# Patient Record
Sex: FEMALE | Race: ASIAN | NOT HISPANIC OR LATINO | URBAN - METROPOLITAN AREA
[De-identification: names, ages, dates, MRNs, and addresses within clinical notes are randomized per-mention and may not be internally consistent; named-entity substitution may affect disease eponyms.]

---

## 2017-09-11 ENCOUNTER — EMERGENCY (EMERGENCY)
Facility: HOSPITAL | Age: 52
LOS: 1 days | Discharge: HOME | End: 2017-09-11
Attending: EMERGENCY MEDICINE | Admitting: EMERGENCY MEDICINE
Payer: MEDICAID

## 2017-09-11 VITALS
HEART RATE: 91 BPM | SYSTOLIC BLOOD PRESSURE: 152 MMHG | OXYGEN SATURATION: 91 % | DIASTOLIC BLOOD PRESSURE: 96 MMHG | TEMPERATURE: 99 F | RESPIRATION RATE: 18 BRPM

## 2017-09-11 DIAGNOSIS — Z98.891 HISTORY OF UTERINE SCAR FROM PREVIOUS SURGERY: Chronic | ICD-10-CM

## 2017-09-11 DIAGNOSIS — G45.4 TRANSIENT GLOBAL AMNESIA: ICD-10-CM

## 2017-09-11 LAB
ALBUMIN SERPL ELPH-MCNC: 4.4 G/DL — SIGNIFICANT CHANGE UP (ref 3.3–5)
ALP SERPL-CCNC: 64 U/L — SIGNIFICANT CHANGE UP (ref 40–120)
ALT FLD-CCNC: 18 U/L — SIGNIFICANT CHANGE UP (ref 4–33)
APPEARANCE UR: CLEAR — SIGNIFICANT CHANGE UP
APTT BLD: 31.8 SEC — SIGNIFICANT CHANGE UP (ref 27.5–37.4)
AST SERPL-CCNC: 21 U/L — SIGNIFICANT CHANGE UP (ref 4–32)
BASE EXCESS BLDV CALC-SCNC: 2.2 MMOL/L — SIGNIFICANT CHANGE UP
BASOPHILS # BLD AUTO: 0.02 K/UL — SIGNIFICANT CHANGE UP (ref 0–0.2)
BASOPHILS NFR BLD AUTO: 0.4 % — SIGNIFICANT CHANGE UP (ref 0–2)
BILIRUB SERPL-MCNC: 0.3 MG/DL — SIGNIFICANT CHANGE UP (ref 0.2–1.2)
BILIRUB UR-MCNC: NEGATIVE — SIGNIFICANT CHANGE UP
BLOOD GAS VENOUS - CREATININE: 0.48 MG/DL — LOW (ref 0.5–1.3)
BLOOD UR QL VISUAL: NEGATIVE — SIGNIFICANT CHANGE UP
BUN SERPL-MCNC: 18 MG/DL — SIGNIFICANT CHANGE UP (ref 7–23)
CALCIUM SERPL-MCNC: 9.3 MG/DL — SIGNIFICANT CHANGE UP (ref 8.4–10.5)
CHLORIDE BLDV-SCNC: 106 MMOL/L — SIGNIFICANT CHANGE UP (ref 96–108)
CHLORIDE SERPL-SCNC: 106 MMOL/L — SIGNIFICANT CHANGE UP (ref 98–107)
CHOLEST SERPL-MCNC: 174 MG/DL — SIGNIFICANT CHANGE UP (ref 120–199)
CO2 SERPL-SCNC: 26 MMOL/L — SIGNIFICANT CHANGE UP (ref 22–31)
COLOR SPEC: SIGNIFICANT CHANGE UP
CREAT SERPL-MCNC: 0.61 MG/DL — SIGNIFICANT CHANGE UP (ref 0.5–1.3)
EOSINOPHIL # BLD AUTO: 0.09 K/UL — SIGNIFICANT CHANGE UP (ref 0–0.5)
EOSINOPHIL NFR BLD AUTO: 1.6 % — SIGNIFICANT CHANGE UP (ref 0–6)
GAS PNL BLDV: 139 MMOL/L — SIGNIFICANT CHANGE UP (ref 136–146)
GLUCOSE BLDV-MCNC: 99 — SIGNIFICANT CHANGE UP (ref 70–99)
GLUCOSE SERPL-MCNC: 102 MG/DL — HIGH (ref 70–99)
GLUCOSE UR-MCNC: NEGATIVE — SIGNIFICANT CHANGE UP
HBA1C BLD-MCNC: 5.9 % — HIGH (ref 4–5.6)
HCO3 BLDV-SCNC: 25 MMOL/L — SIGNIFICANT CHANGE UP (ref 20–27)
HCT VFR BLD CALC: 39.7 % — SIGNIFICANT CHANGE UP (ref 34.5–45)
HCT VFR BLDV CALC: 41.6 % — SIGNIFICANT CHANGE UP (ref 34.5–45)
HDLC SERPL-MCNC: 68 MG/DL — HIGH (ref 45–65)
HGB BLD-MCNC: 13.3 G/DL — SIGNIFICANT CHANGE UP (ref 11.5–15.5)
HGB BLDV-MCNC: 13.5 G/DL — SIGNIFICANT CHANGE UP (ref 11.5–15.5)
IMM GRANULOCYTES # BLD AUTO: 0.01 # — SIGNIFICANT CHANGE UP
IMM GRANULOCYTES NFR BLD AUTO: 0.2 % — SIGNIFICANT CHANGE UP (ref 0–1.5)
INR BLD: 0.92 — SIGNIFICANT CHANGE UP (ref 0.88–1.17)
KETONES UR-MCNC: NEGATIVE — SIGNIFICANT CHANGE UP
LACTATE BLDV-MCNC: 0.8 MMOL/L — SIGNIFICANT CHANGE UP (ref 0.5–2)
LEUKOCYTE ESTERASE UR-ACNC: NEGATIVE — SIGNIFICANT CHANGE UP
LIPID PNL WITH DIRECT LDL SERPL: 100 MG/DL — SIGNIFICANT CHANGE UP
LYMPHOCYTES # BLD AUTO: 1.66 K/UL — SIGNIFICANT CHANGE UP (ref 1–3.3)
LYMPHOCYTES # BLD AUTO: 30.1 % — SIGNIFICANT CHANGE UP (ref 13–44)
MCHC RBC-ENTMCNC: 31.1 PG — SIGNIFICANT CHANGE UP (ref 27–34)
MCHC RBC-ENTMCNC: 33.5 % — SIGNIFICANT CHANGE UP (ref 32–36)
MCV RBC AUTO: 93 FL — SIGNIFICANT CHANGE UP (ref 80–100)
MONOCYTES # BLD AUTO: 0.38 K/UL — SIGNIFICANT CHANGE UP (ref 0–0.9)
MONOCYTES NFR BLD AUTO: 6.9 % — SIGNIFICANT CHANGE UP (ref 2–14)
NEUTROPHILS # BLD AUTO: 3.35 K/UL — SIGNIFICANT CHANGE UP (ref 1.8–7.4)
NEUTROPHILS NFR BLD AUTO: 60.8 % — SIGNIFICANT CHANGE UP (ref 43–77)
NITRITE UR-MCNC: NEGATIVE — SIGNIFICANT CHANGE UP
NRBC # FLD: 0 — SIGNIFICANT CHANGE UP
PCO2 BLDV: 48 MMHG — SIGNIFICANT CHANGE UP (ref 41–51)
PH BLDV: 7.37 PH — SIGNIFICANT CHANGE UP (ref 7.32–7.43)
PH UR: 6.5 — SIGNIFICANT CHANGE UP (ref 4.6–8)
PLATELET # BLD AUTO: 215 K/UL — SIGNIFICANT CHANGE UP (ref 150–400)
PMV BLD: 10.4 FL — SIGNIFICANT CHANGE UP (ref 7–13)
PO2 BLDV: 30 MMHG — LOW (ref 35–40)
POTASSIUM BLDV-SCNC: 3.6 MMOL/L — SIGNIFICANT CHANGE UP (ref 3.4–4.5)
POTASSIUM SERPL-MCNC: 3.9 MMOL/L — SIGNIFICANT CHANGE UP (ref 3.5–5.3)
POTASSIUM SERPL-SCNC: 3.9 MMOL/L — SIGNIFICANT CHANGE UP (ref 3.5–5.3)
PROT SERPL-MCNC: 7.9 G/DL — SIGNIFICANT CHANGE UP (ref 6–8.3)
PROT UR-MCNC: NEGATIVE — SIGNIFICANT CHANGE UP
PROTHROM AB SERPL-ACNC: 10.3 SEC — SIGNIFICANT CHANGE UP (ref 9.8–13.1)
RBC # BLD: 4.27 M/UL — SIGNIFICANT CHANGE UP (ref 3.8–5.2)
RBC # FLD: 11.9 % — SIGNIFICANT CHANGE UP (ref 10.3–14.5)
SAO2 % BLDV: 50.4 % — LOW (ref 60–85)
SODIUM SERPL-SCNC: 147 MMOL/L — HIGH (ref 135–145)
SP GR SPEC: 1 — LOW (ref 1–1.03)
TRIGL SERPL-MCNC: 99 MG/DL — SIGNIFICANT CHANGE UP (ref 10–149)
UROBILINOGEN FLD QL: NORMAL E.U. — SIGNIFICANT CHANGE UP (ref 0.1–0.2)
WBC # BLD: 5.51 K/UL — SIGNIFICANT CHANGE UP (ref 3.8–10.5)
WBC # FLD AUTO: 5.51 K/UL — SIGNIFICANT CHANGE UP (ref 3.8–10.5)
WBC UR QL: SIGNIFICANT CHANGE UP (ref 0–?)

## 2017-09-11 PROCEDURE — 70450 CT HEAD/BRAIN W/O DYE: CPT | Mod: 26

## 2017-09-11 PROCEDURE — 99218: CPT

## 2017-09-11 RX ORDER — ASPIRIN/CALCIUM CARB/MAGNESIUM 324 MG
81 TABLET ORAL DAILY
Qty: 0 | Refills: 0 | Status: DISCONTINUED | OUTPATIENT
Start: 2017-09-11 | End: 2017-09-15

## 2017-09-11 RX ADMIN — Medication 81 MILLIGRAM(S): at 18:24

## 2017-09-11 NOTE — ED PROVIDER NOTE - PROGRESS NOTE DETAILS
Manuel:  assessed patient in triage.  52F unclear medical history, brought in by son for acutely altered mental status.  Pt last seen normal around 3:30pm today by son when he arrived home from school.  Son then took a nap, woken up around 4:30pm by patient, who was then acutely confused.  Did not remember how old son was nor whether he was in school, did not remember where her daughter was, and thought it was year 2011.  Exam:  fingerstick 87, vital signs within normal limits; patient well appearing, rrr, ctab, abd soft ntnd, motor and sensation to light touch grossly equal bilateral face and extremities, speech clear, a&o x 2.  Possibly transient global amnesia, r/o stroke.  Stroke code called, spoke with neurology resident on the phone, endorsed to Dr. Foster and Hemanth on green side.

## 2017-09-11 NOTE — ED CDU PROVIDER NOTE - PLAN OF CARE
Follow up with your doctor for a post hospital visit within 48 hours, taking all results from the ER to be reviewed.  Continue all medications you were on prior to this visit. Follow up with your doctor for a post hospital visit within 48 hours, taking all results from the ER to be reviewed.  Continue all medications you were on prior to this visit. In addition complete the Augmentin 875mg 1 tab twice a day with food we sent to your pharmacy for your sinusitis.  Set up a follow up to be seen by general neurology in 1-2 weeks. Call to make the appointment [phone].  If any recurrent symptoms, worsening, concerning or new signs or symptoms return to the ER

## 2017-09-11 NOTE — CONSULT NOTE ADULT - ASSESSMENT
52 year old right handed Chinese female with no PMH presents with AMS. As per patient's son at bedside, 3pm was the last known normal. Around 430pm the son noticed that patient kept asking the same questions over and over again. Patient states she does not remember anything that happened today or last night. CT head negative. Nonfocal neuro exam however patient keeps repeating the same questions. Memory loss from today.  NIHSS 1. PreMRS 0     Acute memory loss likely secondary to transient global amnesia however PCA infarct should be ruled out. 52 year old right handed Chinese female with no PMH presents with AMS. As per patient's son at bedside, 3pm was the last known normal. Around 430pm the son noticed that patient kept asking the same questions over and over again. Patient states she does not remember anything that happened today or last night. CT head negative. Nonfocal neuro exam however patient keeps repeating the same questions. Memory loss from today. Not a tPA candidate as patient only has memory loss with no other neurological deficits.  NIHSS 1. PreMRS 0     Acute memory loss likely secondary to transient global amnesia however PCA infarct should be ruled out.

## 2017-09-11 NOTE — ED CDU PROVIDER NOTE - PROGRESS NOTE DETAILS
FEDERICO Perkins: Pt resting comfortably with no complaints. Pt awaiting MRI/ MRA. Will continue to monitor. Hugo LÓPEZ- CDU PROGRESS NOTE: No events overnight, ot feels better now, states that her left eye was pink with eye pain behind her left eye few days ago when she had flu like symptoms and now she is not confused, no weakness, numbness    pt is alert, well appearing female, s1s2 normal reg, b/l breath sounds, peerl eomi, abd soft, nt, nd, neuro aox3, cn 2-12 intact, skin warm, dry, good turgor    I have personally performed a face to face diagnostic evaluation on this patient. I have reviewed the ACP note and agree with the history, exam and plan of care FEDERICO [place]: DW Att Hugo, MRI showing sinustis . Paged neuro regarding results- waiting on CB. Will dc home on augmentin 875 BID for 10 days FEDERICO [place]:  Neuro to discuss plan with  ATT prior to DC. WIll call us back SW neurology :  Likely transient amnesia.  No asa needed.  Can make a fu apt in neuro clinic to see general neurology in 1-2 weeks, [phone].  In addition agrees to dc patient on augmentin 875 for sinusitis. AYAZ neurology :  Mri/MRA ok Likely transient amnesia.  No asa needed.  Can make a fu apt in neuro clinic to see general neurology in 1-2 weeks, [phone].  In addition agrees to dc patient on augmentin 875 for sinusitis. AYAZ neurology :  Mri/MRA ok Likely transient global amnesia.  No asa needed.  Can make a fu apt in neuro clinic to see general neurology in 1-2 weeks, [phone].  In addition agrees to dc patient on augmentin 875 for sinusitis. Hugo LÓPEZ- CDU dispo note: pt had neg MRI/ MRA and findings suggestive of sinusitis, discharge don augmentin

## 2017-09-11 NOTE — ED CDU PROVIDER NOTE - OBJECTIVE STATEMENT
52yF w/ no pmhx presenting with confusion that began at approximately 4PM today. Per pts son pt began asking him questions that she knows the answer to such as where her daughter is who is away at college. Son does not recall seeing pt fall. Denies weakness, numbness, tingling, visual changes or difficulty hearing. Denies chest pain, shortness of breath, abd pain, n/v/d, headache, dizziness, fever/chills and any other complaints.  Code stroke called, per neuro NIH score 1    CDU PA Baseil: 52 year old female, no PMHx, presents to the ED for acute confusion. As per patient- she felt normal when she woke up this morning, got in the car, went to the grocery store, came home, put groceries away. She estimates it to be late morning upon her return. However, she can not remember any events following that. As per son, around approximately 1600, the patient began asking him strange questions that were out of the ordinary for her and she should have known, prompting him to bring her to the ED. Upon arrival a code stroke was called. CT head normal, neurology recommends MRI/A to r/o CVA. Pt is alert, oriented to self and time but not place. She denies fevers, chills, nausea, vomiting, diarrhea, headache, numbness, tingling, chest pain, shortness of breath or other complaints.

## 2017-09-11 NOTE — ED CDU PROVIDER NOTE - ATTENDING CONTRIBUTION TO CARE
Dr. Jackson:  I performed a face to face bedside interview with patient regarding history of present illness, review of symptoms and past medical history. I completed an independent physical exam.  I have discussed patient's plan of care with PA.   I agree with note as stated above, having amended the EMR as needed to reflect my findings.   This includes HISTORY OF PRESENT ILLNESS, HIV, PAST MEDICAL/SURGICAL/FAMILY/SOCIAL HISTORY, ALLERGIES AND HOME MEDICATIONS, REVIEW OF SYSTEMS, PHYSICAL EXAM, and any PROGRESS NOTES during the time I functioned as the attending physician for this patient.    52F no significant pmh brought in by son for acutely altered mental status.  Last seen at baseline mentation at approximately 3:30pm by son who came home from school.  Around 4:30pm, son was awoken by patient, who seemed acutely confused.  She repeatedly asked how old her son was, whether or not he was in school, where her daughter was, etc.  Pt does not have any memory of events today.  CT head with no acute findings.  pt denies any acute complaints at this time.    Exam:  - well appearing  - rrr  - ctab  - abd soft ntnd  - a&o x 2, no focal neuro deficits on exam    A/P  - likely transient global amnesia, r/o stroke  - MRI brain without contrast, MRA head and neck  - follow up neurology recommendations

## 2017-09-11 NOTE — ED PROVIDER NOTE - CHPI ED SYMPTOMS NEG
no vomiting/no numbness/no fever/no loss of consciousness/no weakness/no change in level of consciousness

## 2017-09-11 NOTE — ED ADULT NURSE NOTE - CHIEF COMPLAINT QUOTE
patient brought in by adult son. as per son LNW was 3pm, at 430 pt began to experience confusion such as not knowing what the day is, now knowing who the president is. no focal neuro deficits noted. BGL 87 mg/dl. as per patients son pt was completely normal at 3pm    Dr Jackson for JUANA mora.

## 2017-09-11 NOTE — CONSULT NOTE ADULT - SUBJECTIVE AND OBJECTIVE BOX
Neurology Consult    Reason for consult: Code stroke    HPI:  Patient is a 52 year old right handed Chinese female with no PMH presents to the ED with AMS. As per patient's son at bedside, 3pm was the last known normal. Around 430pm the son noticed that patient kept asking the same questions over and over again (where is her daughter, is he still in school, etc.). Patient states she does not remember anything that happened today or last night. She denies headache, changes in vision, changes in hearing, weakness, numbness.     REVIEW OF SYSTEMS:  Constitutional: No fever, chills, fatigue, weakness.  Eyes: No eye pain, visual disturbances, or discharge.  ENT:  No difficulty hearing, tinnitus, vertigo. No sinus or throat pain.  Neck: No pain or stiffness.  Respiratory: No cough, dyspnea, wheezing.  Cardiovascular: No chest pain, palpitations.  Gastrointestinal: No abdominal pain. No nausea, vomiting, diarrhea, or constipation.   Genitourinary: No dysuria, frequency, hematuria or incontinence.  Neurological: No headaches, lightheadedness, vertigo, numbness or tremors.  Psychiatric: No depression, anxiety, mood swings, or difficulty sleeping.  Musculoskeletal: No joint pain or swelling. No muscle, back, or extremity pain.  Skin: No itching, burning, rashes or lesions.   Lymph Nodes: No enlarged glands  Endocrine: No heat or cold intolerance, no hair loss.  Allergy and Immunologic: No hives or eczema.    MEDICATIONS  None    PMH: None     PSH: None    FAMILY HISTORY:  No history of dementia, strokes, or seizures     SOCIAL HISTORY:  No history of tobacco or alcohol use     Allergies  No Known Allergies    Vital Signs Last 24 Hrs  T(C): 37.2 (11 Sep 2017 17:25), Max: 37.2 (11 Sep 2017 17:25)  T(F): 99 (11 Sep 2017 17:25), Max: 99 (11 Sep 2017 17:25)  HR: 91 (11 Sep 2017 17:25) (91 - 91)  BP: 152/96 (11 Sep 2017 17:25) (152/96 - 152/96)  RR: 18 (11 Sep 2017 17:25) (18 - 18)  SpO2: 91% (11 Sep 2017 17:25) (91% - 91%)    Neurological Examination:    Mental Status: Patient is alert, awake, oriented to self, place, and year. Patient is fluent, no dysarthria, no aphasia. Follows commands well and able to answer questions appropriately. Mood and affect normal. Patient keeps asking "why am I here"    Cranial Nerves: PERRL, EOMI, visual field intact, V1-V3 intact, no gross facial asymmetry, tongue/uvula midline    Motor Exam:  Right upper extremity: 5/5  Left upper extremity: 5/5  Right lower extremity: 5/5  Left lower extremity: 5/5    No drift  Normal bulk/tone    Sensory: Intact to light touch bilaterally. No extinction    Coordination: Finger to nose intact bilaterally, heel to shin intact bilaterally    Gait: Normal      Reflexes: 3+ Biceps, 3+ Brachial, 3+ Patellar, 2+ Ankle  Plantar: Down    GENERAL: No acute distress  HEENT:  Normocephalic, atraumatic  EXTREMITIES: No edema, clubbing, cyanosis  MUSCULOSKELETAL: Normal range of motion  SKIN: No rashes    RADIOLOGY:  CT head: No acute pathology

## 2017-09-11 NOTE — ED PROVIDER NOTE - ATTENDING CONTRIBUTION TO CARE
52 year old male who was confused since 4pm today.  Pt is now AOx3 but does not know why she is here.  Confused over whereabouts of daughter.  Short term memory loss. No trauma.  Pt denies any focal weakness.  PE lungs cta abd nt/nd neuro nonfocal exam.  Head ct neg.  Transient Global Amnesia..

## 2017-09-11 NOTE — ED CDU PROVIDER NOTE - CHPI ED SYMPTOMS NEG
no loss of consciousness/no blurred vision/no numbness/no vomiting/no weakness/no change in level of consciousness/no fever

## 2017-09-11 NOTE — ED PROVIDER NOTE - OBJECTIVE STATEMENT
52yF w/ no pmhx presenting with confusion that began at approximately 4PM today. Per pts son pt began asking him questions that she knows the answer to such as where her daughter is who is away at college. Son does not recall seeing pt fall. Denies chest pain, shortness of breath, abd pain, n/v/d, headache, dizziness, fever/chills and any other complaints.  Code stroke called, per neuro NIH score 1 52yF w/ no pmhx presenting with confusion that began at approximately 4PM today. Per pts son pt began asking him questions that she knows the answer to such as where her daughter is who is away at college. Son does not recall seeing pt fall. Denies weakness, numbness, tingling, visual changes or difficulty hearing. Denies chest pain, shortness of breath, abd pain, n/v/d, headache, dizziness, fever/chills and any other complaints.  Code stroke called, per neuro NIH score 1

## 2017-09-11 NOTE — ED PROVIDER NOTE - MEDICAL DECISION MAKING DETAILS
pt w/ suspected TGA pt w/ suspected TGA  52yF w/no PMHx presenting w/ confusion, no neuro deficits, admit to CDU for stroke workup per neuro recs

## 2017-09-11 NOTE — CONSULT NOTE ADULT - PROBLEM SELECTOR RECOMMENDATION 9
MRI brain without contrast  MRA head without contrast and neck with contrast  ASA 81 mg PO QD  HbA1c, lipid panel  Tele monitor  If MRI/MRA is negative, ASA 81 mg can be discontinued

## 2017-09-11 NOTE — ED ADULT NURSE NOTE - OBJECTIVE STATEMENT
Patient received to room 10 awake, alert, oriented x3, able to make needs known verbally.  recently experiencing episodes of confusion and possible fall (son heard bang on wall from another room, did not see her on the floor) at home around 4:00 pm today.  Denies pain.  VSS.  Peripheral IV started, labs obtained.  MD at bedside evaluating patient.  NSR noted on cardiac monitor.  Call bell within reach, safety maintained, will continue to monitor. Patient received to room 10 awake, alert, oriented x2-3 with confusion as to situation, able to make needs known verbally.  possible fall (son heard bang on wall from another room, did not see her on the floor) at home around 4:00 pm today.  Denies pain.  VSS.  Peripheral IV started, labs obtained.  MD at bedside evaluating patient.  NSR noted on cardiac monitor.  Call bell within reach, safety maintained, will continue to monitor.

## 2017-09-12 VITALS
OXYGEN SATURATION: 97 % | DIASTOLIC BLOOD PRESSURE: 73 MMHG | SYSTOLIC BLOOD PRESSURE: 114 MMHG | RESPIRATION RATE: 16 BRPM | HEART RATE: 85 BPM | TEMPERATURE: 98 F

## 2017-09-12 PROCEDURE — 70548 MR ANGIOGRAPHY NECK W/DYE: CPT | Mod: 26

## 2017-09-12 PROCEDURE — 70553 MRI BRAIN STEM W/O & W/DYE: CPT | Mod: 26

## 2017-09-12 PROCEDURE — 99217: CPT

## 2017-09-12 RX ADMIN — Medication 81 MILLIGRAM(S): at 13:09

## 2017-09-13 LAB
BACTERIA UR CULT: SIGNIFICANT CHANGE UP
SPECIMEN SOURCE: SIGNIFICANT CHANGE UP

## 2018-02-24 NOTE — ED ADULT TRIAGE NOTE - CHIEF COMPLAINT QUOTE
patient brought in by adult son. as per son LNW was 3pm, at 430 pt began to experience confusion such as not knowing what the day is, now knowing who the president is. no focal neuro deficits noted. BGL 87 mg/dl. as per patients son pt was completely normal at 3pm    Dr Jackson for JUANA mora.
24-Feb-2018 18:49

## 2021-06-07 NOTE — ED CDU PROVIDER NOTE - PROGRESS NOTE ADDITIONAL1
well developed, well nourished , in no acute distress , ambulating without difficulty , normal communication ability Additional Progress Note...

## 2022-11-10 NOTE — ED CDU PROVIDER NOTE - NEUROLOGICAL NECK
normal Topical Sulfur Applications Pregnancy And Lactation Text: This medication is considered safe during pregnancy and breast feeding secondary to limited systemic absorption.

## 2023-11-28 NOTE — ED CDU PROVIDER NOTE - MUSCULOSKELETAL, MLM
Spine appears normal, range of motion is not limited, no muscle or joint tenderness
0 (no pain/absence of nonverbal indicators of pain)

## 2024-04-30 NOTE — ED CDU PROVIDER NOTE - DATE/TIME 1
Patient would like to get mammogram done at [facility]    11-Sep-2017 22:27